# Patient Record
Sex: MALE | Race: WHITE | NOT HISPANIC OR LATINO | URBAN - METROPOLITAN AREA
[De-identification: names, ages, dates, MRNs, and addresses within clinical notes are randomized per-mention and may not be internally consistent; named-entity substitution may affect disease eponyms.]

---

## 2019-08-16 ENCOUNTER — EMERGENCY (EMERGENCY)
Facility: HOSPITAL | Age: 39
LOS: 1 days | Discharge: HOME | End: 2019-08-16
Attending: EMERGENCY MEDICINE
Payer: COMMERCIAL

## 2019-08-16 VITALS
HEART RATE: 81 BPM | OXYGEN SATURATION: 95 % | HEIGHT: 75 IN | SYSTOLIC BLOOD PRESSURE: 159 MMHG | RESPIRATION RATE: 16 BRPM | TEMPERATURE: 99 F | DIASTOLIC BLOOD PRESSURE: 94 MMHG | WEIGHT: 240.08 LBS

## 2019-08-16 PROCEDURE — 99283 EMERGENCY DEPT VISIT LOW MDM: CPT

## 2019-08-16 RX ORDER — OXYCODONE HYDROCHLORIDE 5 MG/1
1 TABLET ORAL
Qty: 15 | Refills: 0
Start: 2019-08-16

## 2019-08-16 RX ORDER — OXYCODONE HYDROCHLORIDE 5 MG/1
5 TABLET ORAL ONCE
Refills: 0 | Status: DISCONTINUED | OUTPATIENT
Start: 2019-08-16 | End: 2019-08-16

## 2019-08-16 RX ORDER — ACETAMINOPHEN 500 MG
975 TABLET ORAL ONCE
Refills: 0 | Status: COMPLETED | OUTPATIENT
Start: 2019-08-16 | End: 2019-08-16

## 2019-08-16 RX ADMIN — Medication 975 MILLIGRAM(S): at 22:25

## 2019-08-16 RX ADMIN — OXYCODONE HYDROCHLORIDE 5 MILLIGRAM(S): 5 TABLET ORAL at 22:25

## 2019-08-16 NOTE — ED ADULT NURSE NOTE - LOCATION
"Please see \"Imaging\" tab under \"Chart Review\" for details of today's US.      Nataliia Lyman, DO  Maternal-Fetal Medicine        "
tooth

## 2019-08-16 NOTE — ED ADULT NURSE NOTE - NSIMPLEMENTINTERV_GEN_ALL_ED
Implemented All Universal Safety Interventions:  [place] to call system. Call bell, personal items and telephone within reach. Instruct patient to call for assistance. Room bathroom lighting operational. Non-slip footwear when patient is off stretcher. Physically safe environment: no spills, clutter or unnecessary equipment. Stretcher in lowest position, wheels locked, appropriate side rails in place.

## 2019-08-16 NOTE — ED PROVIDER NOTE - PHYSICAL EXAMINATION
NAD, VSS, Afebrile, No obvious facial swelling. # 18dental and gum tender without fluctuating gum swelling. Normal throat with intact airway. Lungs clear. ABD soft, non tender. Neuro- intact.

## 2019-08-16 NOTE — ED PROVIDER NOTE - PROGRESS NOTE DETAILS
Pt's evaluated by dentist, no abscess, recommended pain meds and keep continue clinda as directed by his dentist. Pt has appointment with his dentist this morning.

## 2019-08-16 NOTE — ED PROVIDER NOTE - ATTENDING CONTRIBUTION TO CARE
39y male no pmh, occasional tobacco, comes to ed complains of toothache seen by dental this week and had pulpectomy. Told he needed root canal. Symptoms worsened then had follow up pmd and tx with abx and Motrin now worsening symptoms. Diff swallowing/mouth opening/chewing. not responding to motrin. PE WDWN male awake alert normocephalic atraumatic neck supple chest clear anterior & posterior abd soft neruo no focal defects. Mouth no stridor, post pharynx nl, 18 temp filling in place with tenderness. mild swelling gum line, no fluctuance.  Van Kang MD, Facep

## 2019-08-16 NOTE — CONSULT NOTE ADULT - SUBJECTIVE AND OBJECTIVE BOX
Patient is a 39y old  Male who presents with a chief complaint of tooth pain    HPI: patient states that tooth pain in the lower left started 4 days prior. 2 days prior, patient had a pulpectomy performed at an outpatient dentist. Patient was still in pain and outpatient dentist prescribed antibiotics and advised ibuprofen for pain PRN. Patient states that he has been taking the antibiotics and ibuprofen but the pain has worsen and he is having pain radiating into his chin.      PAST MEDICAL & SURGICAL HISTORY:  No pertinent past medical history  No significant past surgical history    MEDICATIONS  (STANDING):     MEDICATIONS  (PRN):      Allergies: Ceclor (Short breath), Tussin (Short breath)    Intolerances    FAMILY HISTORY: no relevant medical history to chief complaint      SOCIAL HISTORY: patient presented with domestic partner. Patient is a moderate smoker and has been smoking on and off for 20 years.    Last Dental Visit: 8/14/19 for pulpectomy of #18    Vital Signs Last 24 Hrs  T(C): 37.4 (16 Aug 2019 21:53), Max: 37.4 (16 Aug 2019 21:53)  T(F): 99.3 (16 Aug 2019 21:53), Max: 99.3 (16 Aug 2019 21:53)  HR: 81 (16 Aug 2019 21:53) (81 - 81)  BP: 159/94 (16 Aug 2019 21:53) (159/94 - 159/94)  BP(mean): --  RR: 16 (16 Aug 2019 21:53) (16 - 16)  SpO2: 95% (16 Aug 2019 21:53) (95% - 95%)    EOE:  TMJ ( -  ) clicks                    ( - ) pops                    ( - ) crepitus             Mandible: limited range of motion due to pain guarding             Facial bones and MOM grossly intact             ( +  ) trismus             ( -  ) LAD             ( -  ) swelling             ( -  ) asymmetry             ( +  ) palpation             ( -  ) SOB    IOE:  permanent dentition: grossly intact with temporary restoration on #18           hard/soft palate:  ( -  ) palatal torus           tongue/FOM WNL           labial/buccal mucosa WNL           ( +  ) percussion           ( +  ) palpation           ( -  ) swelling     Radiographs: panoramic imaging taken and reviewed. PARL observed apical to #18. Bony structures intact.    LABS: none    ASSESSMENT: symptomatic previously initiated root canal therapy with symptomatic apical periodontitis    PROCEDURE: EOE/IOE performed. No acute signs of dental infection. No signs of swelling intra- or extraorally. #18 (+) to cold, percussion, palpation. (-) to swelling. Mobility class 1 observed. Verbal and written consent given to give nerve block for temporary relief.   Patient states his weight is roughly 240 pounds. 5 carpules of lidocaine 2% w/ 1:100,000 epinephrine administered via inferior alveolar nerve block followed by 2 carpules of 0.5% bupivacaine w/ 1:200,000 epi via local infiltration of symptomatic #18. Patient reports relief and understands that it is temporary and will wear off in a few hours. Patient will follow up with outpatient dentist for continuation of care.     RECOMMENDATIONS:   1) Continue taking pain medication as per medical team.  2) Continue taking antibiotics as prescribed by outpatient dentist.   2) Dental F/U with outpatient dentist for comprehensive dental care.   3) If any difficulty swallowing/breathing, fever occur, page dental.     Mishel Nair DDS and Shelton David DMD, pager #[phone]

## 2019-08-16 NOTE — ED PROVIDER NOTE - NSFOLLOWUPINSTRUCTIONS_ED_ALL_ED_FT
Keep continue your current medications, Clindamycin and Ibuprofen as directed by your dentist.   Oxycodone 1 tablet every 6hours for severe pain with cautions of drowsiness and constipation.  Stool softener as needed.  Follow up with your dentist as scheduled this morning.   Return for any concerns, facial swelling or any worsening symptoms.

## 2019-08-16 NOTE — ED PROVIDER NOTE - OBJECTIVE STATEMENT
40yo male pt, no PMHx, ambulatory c/o left lower dental pain since 3-4days. Pt's evaluated by his dentist s/p pulpectomy and on Clindamycin/ Ibuprofen since yesterday but he noticed worsening pain with painful swallowing today. He also felt subjective fever. Denies cough or congestion. Denies CP/SOB/ABD pain or N/V/D. Denies sensory changes or weakness to extremities.

## 2019-08-17 VITALS
OXYGEN SATURATION: 96 % | TEMPERATURE: 98 F | DIASTOLIC BLOOD PRESSURE: 69 MMHG | SYSTOLIC BLOOD PRESSURE: 111 MMHG | HEART RATE: 81 BPM | RESPIRATION RATE: 18 BRPM

## 2021-02-26 PROBLEM — Z78.9 OTHER SPECIFIED HEALTH STATUS: Chronic | Status: ACTIVE | Noted: 2019-08-16

## 2021-03-16 ENCOUNTER — APPOINTMENT (OUTPATIENT)
Dept: RHEUMATOLOGY | Facility: CLINIC | Age: 41
End: 2021-03-16
Payer: COMMERCIAL

## 2021-03-16 VITALS
BODY MASS INDEX: 32.73 KG/M2 | SYSTOLIC BLOOD PRESSURE: 145 MMHG | TEMPERATURE: 97.7 F | HEART RATE: 71 BPM | OXYGEN SATURATION: 97 % | DIASTOLIC BLOOD PRESSURE: 75 MMHG | WEIGHT: 255 LBS | HEIGHT: 74 IN

## 2021-03-16 DIAGNOSIS — R53.82 CHRONIC FATIGUE, UNSPECIFIED: ICD-10-CM

## 2021-03-16 DIAGNOSIS — M25.541 PAIN IN JOINTS OF RIGHT HAND: ICD-10-CM

## 2021-03-16 DIAGNOSIS — M25.542 PAIN IN JOINTS OF RIGHT HAND: ICD-10-CM

## 2021-03-16 DIAGNOSIS — M25.50 PAIN IN UNSPECIFIED JOINT: ICD-10-CM

## 2021-03-16 DIAGNOSIS — Z11.3 ENCOUNTER FOR SCREENING FOR INFECTIONS WITH A PREDOMINANTLY SEXUAL MODE OF TRANSMISSION: ICD-10-CM

## 2021-03-16 PROCEDURE — 99072 ADDL SUPL MATRL&STAF TM PHE: CPT

## 2021-03-16 PROCEDURE — 99204 OFFICE O/P NEW MOD 45 MIN: CPT

## 2021-03-16 RX ORDER — DOXYLAMINE SUCCINATE 25 MG
TABLET ORAL
Refills: 0 | Status: ACTIVE | COMMUNITY

## 2021-03-16 RX ORDER — CLINDAMYCIN HYDROCHLORIDE 300 MG/1
CAPSULE ORAL
Refills: 0 | Status: ACTIVE | COMMUNITY

## 2021-03-17 PROBLEM — M25.50 HYPERMOBILITY ARTHRALGIA: Status: ACTIVE | Noted: 2021-03-17

## 2021-03-17 RX ORDER — ERGOCALCIFEROL 1.25 MG/1
1.25 MG CAPSULE, LIQUID FILLED ORAL
Qty: 12 | Refills: 0 | Status: ACTIVE | COMMUNITY
Start: 2021-03-17 | End: 1900-01-01

## 2021-03-22 DIAGNOSIS — M25.50 PAIN IN UNSPECIFIED JOINT: ICD-10-CM

## 2021-03-22 DIAGNOSIS — E55.9 VITAMIN D DEFICIENCY, UNSPECIFIED: ICD-10-CM

## 2021-03-22 LAB
25(OH)D3 SERPL-MCNC: 18.1 NG/ML
ALBUMIN SERPL ELPH-MCNC: 4.7 G/DL
ALP BLD-CCNC: 74 U/L
ALT SERPL-CCNC: 20 U/L
ANION GAP SERPL CALC-SCNC: 12 MMOL/L
AST SERPL-CCNC: 24 U/L
BASOPHILS # BLD AUTO: 0.05 K/UL
BASOPHILS NFR BLD AUTO: 0.8 %
BILIRUB SERPL-MCNC: 0.5 MG/DL
BUN SERPL-MCNC: 21 MG/DL
CALCIUM SERPL-MCNC: 9.9 MG/DL
CHLORIDE SERPL-SCNC: 104 MMOL/L
CO2 SERPL-SCNC: 24 MMOL/L
CREAT SERPL-MCNC: 0.87 MG/DL
CRP SERPL-MCNC: <3 MG/L
EOSINOPHIL # BLD AUTO: 0.1 K/UL
EOSINOPHIL NFR BLD AUTO: 1.5 %
ERYTHROCYTE [SEDIMENTATION RATE] IN BLOOD BY WESTERGREN METHOD: 11 MM/HR
FOLATE SERPL-MCNC: 9.1 NG/ML
GLUCOSE SERPL-MCNC: 87 MG/DL
HBV CORE IGG+IGM SER QL: NONREACTIVE
HBV CORE IGM SER QL: NONREACTIVE
HBV SURFACE AB SER QL: REACTIVE
HBV SURFACE AG SER QL: NONREACTIVE
HCT VFR BLD CALC: 43 %
HCV AB SER QL: NONREACTIVE
HCV S/CO RATIO: 0.04 S/CO
HEPATITIS A IGG ANTIBODY: NONREACTIVE
HGB BLD-MCNC: 14.1 G/DL
HIV1+2 AB SPEC QL IA.RAPID: NONREACTIVE
IMM GRANULOCYTES NFR BLD AUTO: 0.2 %
LYMPHOCYTES # BLD AUTO: 2.02 K/UL
LYMPHOCYTES NFR BLD AUTO: 30.9 %
MAN DIFF?: NORMAL
MCHC RBC-ENTMCNC: 29.7 PG
MCHC RBC-ENTMCNC: 32.8 GM/DL
MCV RBC AUTO: 90.5 FL
MONOCYTES # BLD AUTO: 0.61 K/UL
MONOCYTES NFR BLD AUTO: 9.3 %
NEUTROPHILS # BLD AUTO: 3.74 K/UL
NEUTROPHILS NFR BLD AUTO: 57.3 %
PLATELET # BLD AUTO: 266 K/UL
POTASSIUM SERPL-SCNC: 4.9 MMOL/L
PROT SERPL-MCNC: 7 G/DL
RBC # BLD: 4.75 M/UL
RBC # FLD: 12.6 %
SODIUM SERPL-SCNC: 140 MMOL/L
T PALLIDUM AB SER QL IA: NEGATIVE
T4 FREE SERPL-MCNC: 1.6 NG/DL
TSH SERPL-ACNC: 1.53 UIU/ML
VIT B12 SERPL-MCNC: 415 PG/ML
WBC # FLD AUTO: 6.53 K/UL

## 2021-06-01 ENCOUNTER — RX RENEWAL (OUTPATIENT)
Age: 41
End: 2021-06-01
